# Patient Record
Sex: MALE | Race: OTHER | HISPANIC OR LATINO | Employment: FULL TIME | URBAN - METROPOLITAN AREA
[De-identification: names, ages, dates, MRNs, and addresses within clinical notes are randomized per-mention and may not be internally consistent; named-entity substitution may affect disease eponyms.]

---

## 2019-03-26 ENCOUNTER — HOSPITAL ENCOUNTER (EMERGENCY)
Facility: HOSPITAL | Age: 34
Discharge: HOME | End: 2019-03-26
Attending: EMERGENCY MEDICINE | Admitting: EMERGENCY MEDICINE
Payer: COMMERCIAL

## 2019-03-26 ENCOUNTER — APPOINTMENT (EMERGENCY)
Dept: CT IMAGING | Facility: HOSPITAL | Age: 34
End: 2019-03-26
Payer: COMMERCIAL

## 2019-03-26 VITALS
WEIGHT: 199.08 LBS | RESPIRATION RATE: 18 BRPM | OXYGEN SATURATION: 100 % | SYSTOLIC BLOOD PRESSURE: 185 MMHG | DIASTOLIC BLOOD PRESSURE: 114 MMHG | HEART RATE: 86 BPM | TEMPERATURE: 99 F

## 2019-03-26 DIAGNOSIS — G51.0 BELL'S PALSY: Primary | ICD-10-CM

## 2019-03-26 PROCEDURE — 99284 EMERGENCY DEPT VISIT MOD MDM: CPT

## 2019-03-26 PROCEDURE — 70450 CT HEAD/BRAIN W/O DYE: CPT

## 2019-03-26 RX ORDER — ACYCLOVIR 400 MG/1
400 TABLET ORAL 4 TIMES DAILY
Qty: 28 TABLET | Refills: 0 | Status: SHIPPED | OUTPATIENT
Start: 2019-03-26 | End: 2019-04-02

## 2019-03-26 RX ORDER — PREDNISONE 20 MG/1
60 TABLET ORAL DAILY
Qty: 15 TABLET | Refills: 0 | Status: SHIPPED | OUTPATIENT
Start: 2019-03-26 | End: 2019-03-31

## 2019-03-26 RX ORDER — CARBOXYMETHYLCELLULOSE SODIUM 5 MG/ML
1 SOLUTION/ DROPS OPHTHALMIC 3 TIMES DAILY PRN
Qty: 1 BOTTLE | Refills: 0 | Status: SHIPPED | OUTPATIENT
Start: 2019-03-26 | End: 2019-05-02 | Stop reason: ALTCHOICE

## 2019-03-26 NOTE — ED PROVIDER NOTES
History  Chief Complaint   Patient presents with    Facial Numbness     L sided  Symptoms began yesterday  Patient reports L eye was drooping  Patient significant other reports patient had mouth drooping as well and could not drink fluids normally  Syptoms of mouth drooping have subsided  L eye still appears to be drooping  Janine Beckford has not had difficulty speaking  Patient remained A/O durin this time yesterday  Patient is a 24-year-old male with a history of diabetes who presents with a 1 day history of left-sided facial paralysis  The patient's mother states that symptoms began around noon yesterday  Complete paralysis on the left side of the face  Denies any history of similar symptoms  Denies any headache her numbness  No difficulty swallowing  Patient without any fevers sweats chills  No chest pain shortness of breath or other complaints  States sugar today was around 120  Denies any other sick contacts  Nothing makes this better or worse  None       Past Medical History:   Diagnosis Date    Diabetes mellitus ([facility] [address] )     Psychiatric disorder     Psychiatric disorder        History reviewed  No pertinent surgical history  History reviewed  No pertinent family history  I have reviewed and agree with the history as documented  Social History     Tobacco Use    Smoking status: Never Smoker    Smokeless tobacco: Never Used   Substance Use Topics    Alcohol use: Yes    Drug use: Never        Review of Systems   Constitutional: Negative  HENT: Negative  Eyes: Negative  Respiratory: Negative  Cardiovascular: Negative  Gastrointestinal: Negative  Endocrine: Negative  Genitourinary: Negative  Musculoskeletal: Negative  Skin: Negative  Allergic/Immunologic: Negative  Neurological: Positive for facial asymmetry  Hematological: Negative  Psychiatric/Behavioral: Negative  All other systems reviewed and are negative        Physical Exam  Physical Exam   Constitutional: He is oriented to person, place, and time  He appears well-developed and well-nourished  HENT:   Head: Normocephalic  Right Ear: External ear normal    Left Ear: External ear normal    Nose: Nose normal    Mouth/Throat: Oropharynx is clear and moist    Eyes: Pupils are equal, round, and reactive to light  Conjunctivae and EOM are normal    Neck: Normal range of motion  Neck supple  Cardiovascular: Normal rate, regular rhythm, normal heart sounds and intact distal pulses  Pulmonary/Chest: Effort normal and breath sounds normal    Abdominal: Soft  Bowel sounds are normal    Musculoskeletal: Normal range of motion  Neurological: He is alert and oriented to person, place, and time  He displays normal reflexes  A cranial nerve deficit is present  No sensory deficit  He exhibits normal muscle tone  Coordination normal    Complete paralysis on the left face including forehead  Skin: Skin is warm and dry  Capillary refill takes less than 2 seconds  Psychiatric: He has a normal mood and affect  His behavior is normal  Thought content normal    Nursing note and vitals reviewed  Vital Signs  ED Triage Vitals [03/26/19 1459]   Temperature Pulse Respirations Blood Pressure SpO2   99 °F (37 2 °C) 86 18 (!) 185/114 100 %      Temp Source Heart Rate Source Patient Position - Orthostatic VS BP Location FiO2 (%)   Tympanic Monitor Sitting Left arm --      Pain Score       No Pain           Vitals:    03/26/19 1459   BP: (!) 185/114   Pulse: 86   Patient Position - Orthostatic VS: Sitting         Visual Acuity      ED Medications  Medications - No data to display    Diagnostic Studies  Results Reviewed     None                 CT head without contrast    (Results Pending)              Procedures  Procedures       Phone Contacts  ED Phone Contact    ED Course  ED Course as of Mar 29 1503   Tue Mar 26, 2019   1625 One over results with patient and significant other    Will discharge with steroids and antivirals lack alert follow up with [facility] return to the ER for any concerns  MDM  Number of Diagnoses or Management Options  Bell's palsy:      Amount and/or Complexity of Data Reviewed  Tests in the radiology section of CPT®: ordered and reviewed        Disposition  Final diagnoses:   None     ED Disposition     None      Follow-up Information    None         Patient's Medications    No medications on file     No discharge procedures on file      ED Provider  Electronically Signed by           Marianna Caruso MD  03/26/19 1513       Marianna Caruso MD  03/29/19 035

## 2019-04-03 ENCOUNTER — OFFICE VISIT (OUTPATIENT)
Dept: FAMILY MEDICINE CLINIC | Facility: CLINIC | Age: 34
End: 2019-04-03
Payer: COMMERCIAL

## 2019-04-03 VITALS
TEMPERATURE: 98.2 F | HEIGHT: 69 IN | WEIGHT: 195 LBS | OXYGEN SATURATION: 98 % | HEART RATE: 90 BPM | BODY MASS INDEX: 28.88 KG/M2 | RESPIRATION RATE: 20 BRPM | DIASTOLIC BLOOD PRESSURE: 100 MMHG | SYSTOLIC BLOOD PRESSURE: 160 MMHG

## 2019-04-03 DIAGNOSIS — E66.3 OVERWEIGHT (BMI 25.0-29.9): ICD-10-CM

## 2019-04-03 DIAGNOSIS — Z23 NEEDS FLU SHOT: ICD-10-CM

## 2019-04-03 DIAGNOSIS — Z13.220 SCREENING FOR CHOLESTEROL LEVEL: ICD-10-CM

## 2019-04-03 DIAGNOSIS — I10 ESSENTIAL HYPERTENSION: Primary | ICD-10-CM

## 2019-04-03 DIAGNOSIS — G51.0 BELL'S PALSY: ICD-10-CM

## 2019-04-03 DIAGNOSIS — R73.9 HYPERGLYCEMIA: ICD-10-CM

## 2019-04-03 DIAGNOSIS — R53.83 FATIGUE, UNSPECIFIED TYPE: ICD-10-CM

## 2019-04-03 DIAGNOSIS — E11.65 TYPE 2 DIABETES MELLITUS WITH HYPERGLYCEMIA, WITHOUT LONG-TERM CURRENT USE OF INSULIN (HCC): ICD-10-CM

## 2019-04-03 PROCEDURE — 99204 OFFICE O/P NEW MOD 45 MIN: CPT | Performed by: NURSE PRACTITIONER

## 2019-04-03 RX ORDER — LISINOPRIL AND HYDROCHLOROTHIAZIDE 25; 20 MG/1; MG/1
1 TABLET ORAL DAILY
Qty: 30 TABLET | Refills: 5 | Status: SHIPPED | OUTPATIENT
Start: 2019-04-03

## 2019-04-04 PROBLEM — E11.65 TYPE 2 DIABETES MELLITUS WITH HYPERGLYCEMIA, WITHOUT LONG-TERM CURRENT USE OF INSULIN (HCC): Status: ACTIVE | Noted: 2019-04-04

## 2019-04-04 PROBLEM — E66.3 OVERWEIGHT (BMI 25.0-29.9): Status: ACTIVE | Noted: 2019-04-04

## 2019-04-04 PROBLEM — G51.0 BELL'S PALSY: Status: ACTIVE | Noted: 2019-04-04

## 2019-04-17 ENCOUNTER — TRANSCRIBE ORDERS (OUTPATIENT)
Dept: ADMINISTRATIVE | Facility: HOSPITAL | Age: 34
End: 2019-04-17

## 2019-04-17 ENCOUNTER — APPOINTMENT (OUTPATIENT)
Dept: LAB | Facility: HOSPITAL | Age: 34
End: 2019-04-17
Payer: COMMERCIAL

## 2019-04-17 DIAGNOSIS — R53.83 FATIGUE, UNSPECIFIED TYPE: ICD-10-CM

## 2019-04-17 DIAGNOSIS — E66.3 OVERWEIGHT (BMI 25.0-29.9): ICD-10-CM

## 2019-04-17 DIAGNOSIS — E11.65 TYPE 2 DIABETES MELLITUS WITH HYPERGLYCEMIA, WITHOUT LONG-TERM CURRENT USE OF INSULIN (HCC): ICD-10-CM

## 2019-04-17 DIAGNOSIS — Z13.220 SCREENING FOR CHOLESTEROL LEVEL: ICD-10-CM

## 2019-04-17 DIAGNOSIS — R73.9 HYPERGLYCEMIA: ICD-10-CM

## 2019-04-17 LAB
ALBUMIN SERPL BCP-MCNC: 4.3 G/DL (ref 3–5.2)
ALP SERPL-CCNC: 100 U/L (ref 43–122)
ALT SERPL W P-5'-P-CCNC: 18 U/L (ref 9–52)
ANION GAP SERPL CALCULATED.3IONS-SCNC: 8 MMOL/L (ref 5–14)
AST SERPL W P-5'-P-CCNC: 19 U/L (ref 17–59)
BASOPHILS # BLD AUTO: 0.1 THOUSANDS/ΜL (ref 0–0.1)
BASOPHILS NFR BLD AUTO: 1 % (ref 0–1)
BILIRUB SERPL-MCNC: 0.5 MG/DL
BUN SERPL-MCNC: 9 MG/DL (ref 5–25)
CALCIUM SERPL-MCNC: 9.6 MG/DL (ref 8.4–10.2)
CHLORIDE SERPL-SCNC: 98 MMOL/L (ref 97–108)
CHOLEST SERPL-MCNC: 199 MG/DL
CO2 SERPL-SCNC: 30 MMOL/L (ref 22–30)
CREAT SERPL-MCNC: 0.61 MG/DL (ref 0.7–1.5)
EOSINOPHIL # BLD AUTO: 0.5 THOUSAND/ΜL (ref 0–0.4)
EOSINOPHIL NFR BLD AUTO: 6 % (ref 0–6)
ERYTHROCYTE [DISTWIDTH] IN BLOOD BY AUTOMATED COUNT: 13.5 %
EST. AVERAGE GLUCOSE BLD GHB EST-MCNC: 209 MG/DL
GFR SERPL CREATININE-BSD FRML MDRD: 131 ML/MIN/1.73SQ M
GLUCOSE P FAST SERPL-MCNC: 244 MG/DL (ref 70–99)
HBA1C MFR BLD: 8.9 % (ref 4.2–6.3)
HCT VFR BLD AUTO: 46 % (ref 41–53)
HDLC SERPL-MCNC: 35 MG/DL (ref 40–59)
HGB BLD-MCNC: 15.7 G/DL (ref 13.5–17.5)
LDLC SERPL CALC-MCNC: 111 MG/DL
LYMPHOCYTES # BLD AUTO: 1.8 THOUSANDS/ΜL (ref 0.5–4)
LYMPHOCYTES NFR BLD AUTO: 24 % (ref 25–45)
MCH RBC QN AUTO: 30.2 PG (ref 26–34)
MCHC RBC AUTO-ENTMCNC: 34.2 G/DL (ref 31–36)
MCV RBC AUTO: 88 FL (ref 80–100)
MONOCYTES # BLD AUTO: 0.5 THOUSAND/ΜL (ref 0.2–0.9)
MONOCYTES NFR BLD AUTO: 7 % (ref 1–10)
NEUTROPHILS # BLD AUTO: 4.7 THOUSANDS/ΜL (ref 1.8–7.8)
NEUTS SEG NFR BLD AUTO: 62 % (ref 45–65)
NONHDLC SERPL-MCNC: 164 MG/DL
PLATELET # BLD AUTO: 221 THOUSANDS/UL (ref 150–450)
PMV BLD AUTO: 8.9 FL (ref 8.9–12.7)
POTASSIUM SERPL-SCNC: 4.9 MMOL/L (ref 3.6–5)
PROT SERPL-MCNC: 7.3 G/DL (ref 5.9–8.4)
RBC # BLD AUTO: 5.2 MILLION/UL (ref 4.5–5.9)
SODIUM SERPL-SCNC: 136 MMOL/L (ref 137–147)
TRIGL SERPL-MCNC: 267 MG/DL
TSH SERPL DL<=0.05 MIU/L-ACNC: 3.59 UIU/ML (ref 0.47–4.68)
WBC # BLD AUTO: 7.5 THOUSAND/UL (ref 4.5–11)

## 2019-04-17 PROCEDURE — 85025 COMPLETE CBC W/AUTO DIFF WBC: CPT

## 2019-04-17 PROCEDURE — 84443 ASSAY THYROID STIM HORMONE: CPT

## 2019-04-17 PROCEDURE — 36415 COLL VENOUS BLD VENIPUNCTURE: CPT

## 2019-04-17 PROCEDURE — 80061 LIPID PANEL: CPT

## 2019-04-17 PROCEDURE — 83036 HEMOGLOBIN GLYCOSYLATED A1C: CPT

## 2019-04-17 PROCEDURE — 80053 COMPREHEN METABOLIC PANEL: CPT

## 2019-05-02 ENCOUNTER — OFFICE VISIT (OUTPATIENT)
Dept: FAMILY MEDICINE CLINIC | Facility: CLINIC | Age: 34
End: 2019-05-02
Payer: COMMERCIAL

## 2019-05-02 VITALS
BODY MASS INDEX: 29.03 KG/M2 | SYSTOLIC BLOOD PRESSURE: 132 MMHG | OXYGEN SATURATION: 98 % | HEART RATE: 91 BPM | DIASTOLIC BLOOD PRESSURE: 82 MMHG | WEIGHT: 196 LBS | RESPIRATION RATE: 20 BRPM | HEIGHT: 69 IN | TEMPERATURE: 98.2 F

## 2019-05-02 DIAGNOSIS — E78.2 MIXED HYPERLIPIDEMIA: ICD-10-CM

## 2019-05-02 DIAGNOSIS — E11.65 TYPE 2 DIABETES MELLITUS WITH HYPERGLYCEMIA, WITHOUT LONG-TERM CURRENT USE OF INSULIN (HCC): Primary | ICD-10-CM

## 2019-05-02 DIAGNOSIS — I10 ESSENTIAL HYPERTENSION: ICD-10-CM

## 2019-05-02 LAB
CREAT UR-MCNC: 97.9 MG/DL
MICROALBUMIN UR-MCNC: <5 MG/L (ref 0–20)
MICROALBUMIN/CREAT 24H UR: <5 MG/G CREATININE (ref 0–30)

## 2019-05-02 PROCEDURE — 82043 UR ALBUMIN QUANTITATIVE: CPT | Performed by: NURSE PRACTITIONER

## 2019-05-02 PROCEDURE — 82570 ASSAY OF URINE CREATININE: CPT | Performed by: NURSE PRACTITIONER

## 2019-05-02 PROCEDURE — 99214 OFFICE O/P EST MOD 30 MIN: CPT | Performed by: NURSE PRACTITIONER

## 2019-06-11 LAB
LEFT EYE DIABETIC RETINOPATHY: NORMAL
RIGHT EYE DIABETIC RETINOPATHY: NORMAL

## 2019-07-22 ENCOUNTER — OFFICE VISIT (OUTPATIENT)
Dept: FAMILY MEDICINE CLINIC | Facility: CLINIC | Age: 34
End: 2019-07-22
Payer: COMMERCIAL

## 2019-07-22 VITALS
BODY MASS INDEX: 29.92 KG/M2 | SYSTOLIC BLOOD PRESSURE: 122 MMHG | HEART RATE: 90 BPM | TEMPERATURE: 97.2 F | OXYGEN SATURATION: 98 % | DIASTOLIC BLOOD PRESSURE: 80 MMHG | RESPIRATION RATE: 18 BRPM | WEIGHT: 202 LBS | HEIGHT: 69 IN

## 2019-07-22 DIAGNOSIS — E11.65 TYPE 2 DIABETES MELLITUS WITH HYPERGLYCEMIA, WITHOUT LONG-TERM CURRENT USE OF INSULIN (HCC): Primary | ICD-10-CM

## 2019-07-22 DIAGNOSIS — I10 ESSENTIAL HYPERTENSION: ICD-10-CM

## 2019-07-22 DIAGNOSIS — E78.2 MIXED HYPERLIPIDEMIA: ICD-10-CM

## 2019-07-22 LAB — SL AMB POCT HEMOGLOBIN AIC: 8.3 (ref ?–6.5)

## 2019-07-22 PROCEDURE — 83036 HEMOGLOBIN GLYCOSYLATED A1C: CPT | Performed by: NURSE PRACTITIONER

## 2019-07-22 PROCEDURE — 99214 OFFICE O/P EST MOD 30 MIN: CPT | Performed by: NURSE PRACTITIONER

## 2019-07-22 RX ORDER — ATORVASTATIN CALCIUM 40 MG/1
40 TABLET, FILM COATED ORAL DAILY
Qty: 30 TABLET | Refills: 5 | Status: SHIPPED | OUTPATIENT
Start: 2019-07-22

## 2019-07-22 NOTE — ASSESSMENT & PLAN NOTE
Lab Results   Component Value Date    HGBA1C 8 3 (A) 07/22/2019       No results for input(s): POCGLU in the last 72 hours  Blood Sugar Average: Last 72 hrs:  -Today A1c 8 3% with some improvement from previous 8 9% in 4/2019  Pt currently only on Jardiance 25mg daily from [facility]  I recommended to patient to start on Metformin 500mg BID  To continue to follow his diet and exercise regimen  For follow up in 3 months for DM check

## 2019-07-22 NOTE — ASSESSMENT & PLAN NOTE
-Today we are starting patient on Atorvastatin 40mg daily as he is diabetic and did have elevated cholesterol  Pt to continue to follow his diet regimen with exercise and weight loss  To follow up every 6 mo

## 2019-07-22 NOTE — PROGRESS NOTES
Assessment/Plan:    Type 2 diabetes mellitus with hyperglycemia, without long-term current use of insulin (HCC)  Lab Results   Component Value Date    HGBA1C 8 3 (A) 07/22/2019       No results for input(s): POCGLU in the last 72 hours  Blood Sugar Average: Last 72 hrs:  -Today A1c 8 3% with some improvement from previous 8 9% in 4/2019  Pt currently only on Jardiance 25mg daily from [facility]  I recommended to patient to start on Metformin 500mg BID  To continue to follow his diet and exercise regimen  For follow up in 3 months for DM check  Essential hypertension  -With good control today  To continue on lisinopril/hctz 20-25mg daily as indicated  To have repeat CMP in 10/2019  Follow up every 6 months  Mixed hyperlipidemia  -Today we are starting patient on Atorvastatin 40mg daily as he is diabetic and did have elevated cholesterol  Pt to continue to follow his diet regimen with exercise and weight loss  To follow up every 6 mo  Diagnoses and all orders for this visit:    Type 2 diabetes mellitus with hyperglycemia, without long-term current use of insulin (HCC)  -     POCT hemoglobin A1c  -     metFORMIN (GLUCOPHAGE) 500 mg tablet; Take 1 tablet (500 mg total) by mouth 2 (two) times a day with meals    Mixed hyperlipidemia  -     atorvastatin (LIPITOR) 40 mg tablet; Take 1 tablet (40 mg total) by mouth daily    Essential hypertension    Other orders  -     Cancel: Comprehensive metabolic panel; Future  -     Cancel: HEMOGLOBIN A1C W/ EAG ESTIMATION; Future          Subjective:      Patient ID: Bernardo Lau is a 29 y o  male  Cc  Follow up dm    Diabetes   He presents for his follow-up diabetic visit  He has type 2 diabetes mellitus  No MedicAlert identification noted  His disease course has been fluctuating  There are no hypoglycemic associated symptoms  Pertinent negatives for hypoglycemia include no headaches or sweats   Pertinent negatives for diabetes include no chest pain, no fatigue, no foot paresthesias, no foot ulcerations, no polydipsia, no polyphagia, no polyuria, no visual change and no weight loss  There are no hypoglycemic complications  Symptoms are stable  There are no diabetic complications  Pertinent negatives for diabetic complications include no autonomic neuropathy, CVA, peripheral neuropathy or retinopathy  Risk factors for coronary artery disease include diabetes mellitus, dyslipidemia, male sex, obesity and sedentary lifestyle  Current diabetic treatment includes oral agent (monotherapy)  He is compliant with treatment all of the time  His weight is stable  He is following a generally healthy diet  He has not had a previous visit with a dietitian  He participates in exercise intermittently  There is no change in his home blood glucose trend  An ACE inhibitor/angiotensin II receptor blocker is being taken  He does not see a podiatrist Eye exam is current  Hypertension   This is a recurrent problem  The current episode started today  The problem has been gradually improving since onset  The problem is controlled  Pertinent negatives include no chest pain, headaches, orthopnea, palpitations, peripheral edema, shortness of breath or sweats  There are no associated agents to hypertension  Risk factors for coronary artery disease include diabetes mellitus, dyslipidemia, obesity, male gender and sedentary lifestyle  Past treatments include diuretics and ACE inhibitors  The current treatment provides significant improvement  Compliance problems include diet and exercise  There is no history of angina, kidney disease, CAD/MI, CVA or retinopathy  There is no history of chronic renal disease, a hypertension causing med, sleep apnea or a thyroid problem         The following portions of the patient's history were reviewed and updated as appropriate: allergies, current medications, past family history, past medical history, past social history, past surgical history and problem list     Review of Systems   Constitutional: Positive for unexpected weight change  Negative for appetite change, fatigue and weight loss  HENT: Negative  Negative for congestion  Eyes: Negative  Respiratory: Negative  Negative for chest tightness, shortness of breath and wheezing  Cardiovascular: Negative  Negative for chest pain, palpitations, orthopnea and leg swelling  Gastrointestinal: Negative  Negative for abdominal distention and abdominal pain  Endocrine: Negative  Negative for cold intolerance, polydipsia, polyphagia and polyuria  Genitourinary: Negative  Musculoskeletal: Negative  Negative for arthralgias  Skin: Negative  Allergic/Immunologic: Negative  Negative for environmental allergies and immunocompromised state  Neurological: Negative  Negative for headaches  Hematological: Negative  Negative for adenopathy  Does not bruise/bleed easily  Psychiatric/Behavioral: Negative  Objective:      /80 (BP Location: Left arm, Patient Position: Sitting, Cuff Size: Standard)   Pulse 90   Temp (!) 97 2 °F (36 2 °C) (Temporal)   Resp 18   Ht 5' 9" (1 753 m)   Wt 91 6 kg (202 lb)   SpO2 98%   BMI 29 83 kg/m²          Physical Exam   Constitutional: He is oriented to person, place, and time  He appears well-developed and well-nourished  No distress  HENT:   Head: Normocephalic and atraumatic  Eyes: Pupils are equal, round, and reactive to light  Conjunctivae and EOM are normal    Neck: Normal range of motion  Neck supple  Cardiovascular: Normal rate, regular rhythm, normal heart sounds and intact distal pulses  Exam reveals no gallop and no friction rub  No murmur heard  Pulmonary/Chest: Effort normal and breath sounds normal  No respiratory distress  He has no wheezes  Abdominal: Soft  Bowel sounds are normal  He exhibits no distension  There is no tenderness  Musculoskeletal: Normal range of motion     Lymphadenopathy:     He has no cervical adenopathy  Neurological: He is alert and oriented to person, place, and time  Skin: Skin is warm and dry  Capillary refill takes less than 2 seconds  He is not diaphoretic  Psychiatric: He has a normal mood and affect  His behavior is normal  Judgment and thought content normal    Nursing note and vitals reviewed

## 2019-07-22 NOTE — ASSESSMENT & PLAN NOTE
-With good control today  To continue on lisinopril/hctz 20-25mg daily as indicated  To have repeat CMP in 10/2019  Follow up every 6 months

## 2020-08-13 ENCOUNTER — TELEPHONE (OUTPATIENT)
Dept: FAMILY MEDICINE CLINIC | Facility: CLINIC | Age: 35
End: 2020-08-13

## 2020-08-13 DIAGNOSIS — E11.65 TYPE 2 DIABETES MELLITUS WITH HYPERGLYCEMIA, WITHOUT LONG-TERM CURRENT USE OF INSULIN (HCC): Primary | ICD-10-CM

## 2020-11-03 ENCOUNTER — TELEPHONE (OUTPATIENT)
Dept: FAMILY MEDICINE CLINIC | Facility: CLINIC | Age: 35
End: 2020-11-03

## 2022-05-17 ENCOUNTER — HOSPITAL ENCOUNTER (EMERGENCY)
Facility: HOSPITAL | Age: 37
Discharge: HOME | End: 2022-05-18
Attending: EMERGENCY MEDICINE

## 2022-05-17 DIAGNOSIS — R73.9 HYPERGLYCEMIA: ICD-10-CM

## 2022-05-17 DIAGNOSIS — E86.0 DEHYDRATION: Primary | ICD-10-CM

## 2022-05-17 LAB — GLUCOSE SERPL-MCNC: 351 MG/DL (ref 65–140)

## 2022-05-17 PROCEDURE — 93005 ELECTROCARDIOGRAM TRACING: CPT

## 2022-05-17 PROCEDURE — 82948 REAGENT STRIP/BLOOD GLUCOSE: CPT

## 2022-05-17 PROCEDURE — 99283 EMERGENCY DEPT VISIT LOW MDM: CPT

## 2022-05-18 VITALS
OXYGEN SATURATION: 99 % | BODY MASS INDEX: 29.72 KG/M2 | HEART RATE: 81 BPM | DIASTOLIC BLOOD PRESSURE: 109 MMHG | TEMPERATURE: 97.6 F | SYSTOLIC BLOOD PRESSURE: 180 MMHG | RESPIRATION RATE: 16 BRPM | WEIGHT: 201.28 LBS

## 2022-05-18 LAB
ALBUMIN SERPL BCP-MCNC: 4.9 G/DL (ref 3–5.2)
ALP SERPL-CCNC: 114 U/L (ref 43–122)
ALT SERPL W P-5'-P-CCNC: 40 U/L
ANION GAP SERPL CALCULATED.3IONS-SCNC: 9 MMOL/L (ref 5–14)
AST SERPL W P-5'-P-CCNC: 31 U/L (ref 17–59)
ATRIAL RATE: 81 BPM
BACTERIA UR QL AUTO: NORMAL /HPF
BASE EX.OXY STD BLDV CALC-SCNC: 51.1 % (ref 60–80)
BASE EXCESS BLDV CALC-SCNC: 2 MMOL/L
BASOPHILS # BLD AUTO: 0.06 THOUSANDS/ΜL (ref 0–0.1)
BASOPHILS NFR BLD AUTO: 0 % (ref 0–1)
BETA-HYDROXYBUTYRATE: 0.9 MMOL/L
BILIRUB SERPL-MCNC: 0.75 MG/DL
BILIRUB UR QL STRIP: NEGATIVE
BUN SERPL-MCNC: 10 MG/DL (ref 5–25)
CALCIUM SERPL-MCNC: 9.5 MG/DL (ref 8.4–10.2)
CARDIAC TROPONIN I PNL SERPL HS: <2 NG/L
CHLORIDE SERPL-SCNC: 91 MMOL/L (ref 97–108)
CLARITY UR: CLEAR
CO2 SERPL-SCNC: 30 MMOL/L (ref 22–30)
COLOR UR: ABNORMAL
CREAT SERPL-MCNC: 0.71 MG/DL (ref 0.7–1.5)
EOSINOPHIL # BLD AUTO: 0.11 THOUSAND/ΜL (ref 0–0.61)
EOSINOPHIL NFR BLD AUTO: 1 % (ref 0–6)
ERYTHROCYTE [DISTWIDTH] IN BLOOD BY AUTOMATED COUNT: 11.9 % (ref 11.6–15.1)
GFR SERPL CREATININE-BSD FRML MDRD: 120 ML/MIN/1.73SQ M
GLUCOSE SERPL-MCNC: 324 MG/DL (ref 65–140)
GLUCOSE SERPL-MCNC: 340 MG/DL (ref 70–99)
GLUCOSE UR STRIP-MCNC: ABNORMAL MG/DL
HCO3 BLDV-SCNC: 28.7 MMOL/L (ref 24–30)
HCT VFR BLD AUTO: 46.2 % (ref 36.5–49.3)
HGB BLD-MCNC: 15.7 G/DL (ref 12–17)
HGB UR QL STRIP.AUTO: NEGATIVE
IMM GRANULOCYTES # BLD AUTO: 0.05 THOUSAND/UL (ref 0–0.2)
IMM GRANULOCYTES NFR BLD AUTO: 0 % (ref 0–2)
KETONES UR STRIP-MCNC: ABNORMAL MG/DL
LEUKOCYTE ESTERASE UR QL STRIP: NEGATIVE
LYMPHOCYTES # BLD AUTO: 0.95 THOUSANDS/ΜL (ref 0.6–4.47)
LYMPHOCYTES NFR BLD AUTO: 7 % (ref 14–44)
MCH RBC QN AUTO: 29.2 PG (ref 26.8–34.3)
MCHC RBC AUTO-ENTMCNC: 34 G/DL (ref 31.4–37.4)
MCV RBC AUTO: 86 FL (ref 82–98)
MONOCYTES # BLD AUTO: 0.54 THOUSAND/ΜL (ref 0.17–1.22)
MONOCYTES NFR BLD AUTO: 4 % (ref 4–12)
NEUTROPHILS # BLD AUTO: 11.89 THOUSANDS/ΜL (ref 1.85–7.62)
NEUTS SEG NFR BLD AUTO: 88 % (ref 43–75)
NITRITE UR QL STRIP: NEGATIVE
NON-SQ EPI CELLS URNS QL MICRO: NORMAL /HPF
NRBC BLD AUTO-RTO: 0 /100 WBCS
O2 CT BLDV-SCNC: 11.6 ML/DL
P AXIS: 30 DEGREES
PCO2 BLDV: 52 MM HG (ref 42–50)
PH BLDV: 7.36 [PH] (ref 7.3–7.4)
PH UR STRIP.AUTO: 8 [PH]
PLATELET # BLD AUTO: 219 THOUSANDS/UL (ref 149–390)
PMV BLD AUTO: 10.5 FL (ref 8.9–12.7)
PO2 BLDV: 26.3 MM HG (ref 35–45)
POTASSIUM SERPL-SCNC: 4.2 MMOL/L (ref 3.6–5)
PR INTERVAL: 166 MS
PROT SERPL-MCNC: 8.6 G/DL (ref 5.9–8.4)
PROT UR STRIP-MCNC: NEGATIVE MG/DL
QRS AXIS: 13 DEGREES
QRSD INTERVAL: 100 MS
QT INTERVAL: 378 MS
QTC INTERVAL: 439 MS
RBC # BLD AUTO: 5.38 MILLION/UL (ref 3.88–5.62)
RBC #/AREA URNS AUTO: NORMAL /HPF
SODIUM SERPL-SCNC: 130 MMOL/L (ref 137–147)
SP GR UR STRIP.AUTO: 1.01 (ref 1–1.04)
T WAVE AXIS: 4 DEGREES
UROBILINOGEN UA: NEGATIVE MG/DL
VENTRICULAR RATE: 81 BPM
WBC # BLD AUTO: 13.6 THOUSAND/UL (ref 4.31–10.16)
WBC #/AREA URNS AUTO: NORMAL /HPF

## 2022-05-18 PROCEDURE — 36415 COLL VENOUS BLD VENIPUNCTURE: CPT | Performed by: PHYSICIAN ASSISTANT

## 2022-05-18 PROCEDURE — 96361 HYDRATE IV INFUSION ADD-ON: CPT

## 2022-05-18 PROCEDURE — 81001 URINALYSIS AUTO W/SCOPE: CPT | Performed by: PHYSICIAN ASSISTANT

## 2022-05-18 PROCEDURE — 82010 KETONE BODYS QUAN: CPT | Performed by: PHYSICIAN ASSISTANT

## 2022-05-18 PROCEDURE — 80053 COMPREHEN METABOLIC PANEL: CPT | Performed by: PHYSICIAN ASSISTANT

## 2022-05-18 PROCEDURE — 82805 BLOOD GASES W/O2 SATURATION: CPT | Performed by: PHYSICIAN ASSISTANT

## 2022-05-18 PROCEDURE — 93010 ELECTROCARDIOGRAM REPORT: CPT

## 2022-05-18 PROCEDURE — 99285 EMERGENCY DEPT VISIT HI MDM: CPT | Performed by: PHYSICIAN ASSISTANT

## 2022-05-18 PROCEDURE — 85025 COMPLETE CBC W/AUTO DIFF WBC: CPT | Performed by: PHYSICIAN ASSISTANT

## 2022-05-18 PROCEDURE — 84484 ASSAY OF TROPONIN QUANT: CPT | Performed by: PHYSICIAN ASSISTANT

## 2022-05-18 PROCEDURE — 96360 HYDRATION IV INFUSION INIT: CPT

## 2022-05-18 PROCEDURE — 82948 REAGENT STRIP/BLOOD GLUCOSE: CPT

## 2022-05-18 RX ADMIN — SODIUM CHLORIDE 1000 ML: 0.9 INJECTION, SOLUTION INTRAVENOUS at 00:08

## 2022-05-18 NOTE — ED PROVIDER NOTES
History  Chief Complaint   Patient presents with    Medical Problem     Pt was working, started shaking and vomited  Feels better at this time  Pt's girlfriend gave him soda at time of event  Pt is diabetic  59-year-old male with history of diabetes presents with wife for evaluation after a near syncopal episode  Patient tells me that he was at work and was moving around rapidly when he became dizzy and felt like he was going to faint  Patient then drank a soda and felt better but was concerned that his glucose levels may be off and wanted to get evaluated  Denies any chest pain or shortness of breath during this episode  On arrival to the emergency department patient states that all of his symptoms have resolved  Denies any complaints at this time       History provided by:  Patient   used: No        Prior to Admission Medications   Prescriptions Last Dose Informant Patient Reported? Taking? Empagliflozin (JARDIANCE) 25 MG TABS   No No   Sig: Take 1 tablet (25 mg total) by mouth every morning   acyclovir (ZOVIRAX) 400 MG tablet   No No   Sig: Take 1 tablet (400 mg total) by mouth 4 (four) times a day for 7 days   atorvastatin (LIPITOR) 40 mg tablet   No No   Sig: Take 1 tablet (40 mg total) by mouth daily   lisinopril-hydrochlorothiazide (PRINZIDE,ZESTORETIC) 20-25 MG per tablet   No No   Sig: Take 1 tablet by mouth daily   metFORMIN (GLUCOPHAGE) 500 mg tablet   No No   Sig: Take 1 tablet (500 mg total) by mouth 2 (two) times a day with meals      Facility-Administered Medications: None       Past Medical History:   Diagnosis Date    Bell's palsy     Diabetes mellitus ([facility] [address]     Psychiatric disorder     Psychiatric disorder        History reviewed  No pertinent surgical history  Family History   Problem Relation Age of Onset    Hypertension Mother     Diabetes Father     Hypertension Father      I have reviewed and agree with the history as documented      E-Cigarette/Vaping E-Cigarette/Vaping Substances     Social History     Tobacco Use    Smoking status: Never Smoker    Smokeless tobacco: Never Used   Substance Use Topics    Alcohol use: Yes     Alcohol/week: 8 0 standard drinks     Types: 8 Cans of beer per week    Drug use: Never       Review of Systems   Constitutional: Negative  Negative for chills and fatigue  HENT: Negative for ear pain and sore throat  Eyes: Negative for photophobia and redness  Respiratory: Negative for apnea, cough and shortness of breath  Cardiovascular: Negative for chest pain  Gastrointestinal: Negative for abdominal pain, nausea and vomiting  Genitourinary: Negative for dysuria  Musculoskeletal: Negative for arthralgias, neck pain and neck stiffness  Skin: Negative for rash  Neurological: Positive for light-headedness (Resolved)  Negative for dizziness, tremors, syncope and weakness  Psychiatric/Behavioral: Negative for suicidal ideas  Physical Exam  Physical Exam  Constitutional:       General: He is not in acute distress  Appearance: He is well-developed  He is not diaphoretic  Eyes:      Pupils: Pupils are equal, round, and reactive to light  Cardiovascular:      Rate and Rhythm: Normal rate and regular rhythm  Pulmonary:      Effort: Pulmonary effort is normal  No respiratory distress  Breath sounds: Normal breath sounds  Abdominal:      General: Bowel sounds are normal  There is no distension  Palpations: Abdomen is soft  Tenderness: There is no abdominal tenderness  There is no guarding  Musculoskeletal:         General: Normal range of motion  Cervical back: Normal range of motion and neck supple  Skin:     General: Skin is warm and dry  Neurological:      General: No focal deficit present  Mental Status: He is alert and oriented to person, place, and time  Cranial Nerves: No cranial nerve deficit  Sensory: No sensory deficit  Motor: No weakness  Gait: Gait normal          Vital Signs  ED Triage Vitals [05/17/22 2307]   Temperature Pulse Respirations Blood Pressure SpO2   97 6 °F (36 4 °C) 93 15 (!) 200/117 97 %      Temp Source Heart Rate Source Patient Position - Orthostatic VS BP Location FiO2 (%)   Oral Monitor Sitting Left arm --      Pain Score       --           Vitals:    05/17/22 2307 05/18/22 0129   BP: (!) 200/117 (!) 180/109   Pulse: 93 81   Patient Position - Orthostatic VS: Sitting Lying         Visual Acuity      ED Medications  Medications   sodium chloride 0 9 % bolus 1,000 mL (1,000 mL Intravenous New Bag 5/18/22 0008)       Diagnostic Studies  Results Reviewed     Procedure Component Value Units Date/Time    Urine Microscopic [[id number]]  (Normal) Collected: 05/18/22 0210    Lab Status: Final result Specimen: Urine, Clean Catch Updated: 05/18/22 0226     RBC, UA None Seen /hpf      WBC, UA 1-2 /hpf      Epithelial Cells None Seen /hpf      Bacteria, UA None Seen /hpf     UA (URINE) with reflex to Scope [[id number]]  (Abnormal) Collected: 05/18/22 0210    Lab Status: Final result Specimen: Urine, Clean Catch Updated: 05/18/22 0222     Color, UA Straw     Clarity, UA Clear     Specific Gravity, UA 1 015     pH, UA 8 0     Leukocytes, UA Negative     Nitrite, UA Negative     Protein, UA Negative mg/dl      Glucose, UA >=1000 (1%) mg/dl      Ketones, UA 50 (2+) mg/dl      Bilirubin, UA Negative     Blood, UA Negative     UROBILINOGEN UA Negative mg/dL     HS Troponin I 4hr [[id number]]     Lab Status: No result Specimen: Blood     Fingerstick Glucose (POCT) [[id number]]  (Abnormal) Collected: 05/18/22 0125    Lab Status: Final result Updated: 05/18/22 0128     POC Glucose 324 mg/dl     HS Troponin 0hr (reflex protocol) [[id number]]  (Normal) Collected: 05/18/22 0010    Lab Status: Final result Specimen: Blood from Arm, Right Updated: 05/18/22 0042     hs TnI 0hr <2 ng/L     HS Troponin I 2hr [[id number]]     Lab Status: No result Specimen: Blood Comprehensive metabolic panel [[id number]]  (Abnormal) Collected: 05/18/22 0010    Lab Status: Final result Specimen: Blood from Arm, Right Updated: 05/18/22 0030     Sodium 130 mmol/L      Potassium 4 2 mmol/L      Chloride 91 mmol/L      CO2 30 mmol/L      ANION GAP 9 mmol/L      BUN 10 mg/dL      Creatinine 0 71 mg/dL      Glucose 340 mg/dL      Calcium 9 5 mg/dL      AST 31 U/L      ALT 40 U/L      Alkaline Phosphatase 114 U/L      Total Protein 8 6 g/dL      Albumin 4 9 g/dL      Total Bilirubin 0 75 mg/dL      eGFR 120 ml/min/1 73sq m     Narrative:      National Kidney Disease Foundation guidelines for Chronic Kidney Disease (CKD):     Stage 1 with normal or high GFR (GFR > 90 mL/min/1 73 square meters)    Stage 2 Mild CKD (GFR = 60-89 mL/min/1 73 square meters)    Stage 3A Moderate CKD (GFR = 45-59 mL/min/1 73 square meters)    Stage 3B Moderate CKD (GFR = 30-44 mL/min/1 73 square meters)    Stage 4 Severe CKD (GFR = 15-29 mL/min/1 73 square meters)    Stage 5 End Stage CKD (GFR <15 mL/min/1 73 square meters)  Note: GFR calculation is accurate only with a steady state creatinine    Beta Hydroxybutyrate [[id number]]  (Abnormal) Collected: 05/18/22 0010    Lab Status: Final result Specimen: Blood from Arm, Right Updated: 05/18/22 0024     BETA-HYDROXYBUTYRATE 0 9 mmol/L     Blood gas, venous [[id number]]  (Abnormal) Collected: 05/18/22 0010    Lab Status: Final result Specimen: Blood from Arm, Left Updated: 05/18/22 0020     pH, Anil 7 359     pCO2, Anil 52 0 mm Hg      pO2, Anil 26 3 mm Hg      HCO3, Anil 28 7 mmol/L      Base Excess, Anil 2 0 mmol/L      O2 Content, Anil 11 6 ml/dL      O2 HGB, VENOUS 51 1 %     CBC and differential [[id number]]  (Abnormal) Collected: 05/18/22 0010    Lab Status: Final result Specimen: Blood from Arm, Right Updated: 05/18/22 0019     WBC 13 60 Thousand/uL      RBC 5 38 Million/uL      Hemoglobin 15 7 g/dL      Hematocrit 46 2 %      MCV 86 fL      MCH 29 2 pg      MCHC 34 0 g/dL      RDW 11 9 %      MPV 10 5 fL      Platelets 860 Thousands/uL      nRBC 0 /100 WBCs      Neutrophils Relative 88 %      Immat GRANS % 0 %      Lymphocytes Relative 7 %      Monocytes Relative 4 %      Eosinophils Relative 1 %      Basophils Relative 0 %      Neutrophils Absolute 11 89 Thousands/µL      Immature Grans Absolute 0 05 Thousand/uL      Lymphocytes Absolute 0 95 Thousands/µL      Monocytes Absolute 0 54 Thousand/µL      Eosinophils Absolute 0 11 Thousand/µL      Basophils Absolute 0 06 Thousands/µL     Fingerstick Glucose (POCT) [[id number]]  (Abnormal) Collected: 05/17/22 2311    Lab Status: Final result Updated: 05/17/22 2312     POC Glucose 351 mg/dl                  No orders to display              Procedures  ECG 12 Lead Documentation Only    Date/Time: 5/18/2022 2:07 AM  Performed by: Camryn Soriano PA-C  Authorized by: Camryn Soriano PA-C     Indications / Diagnosis:  Weakness   ECG reviewed by me, the ED Provider: yes    Patient location:  ED  Interpretation:     Interpretation: normal    Rate:     ECG rate:  81    ECG rate assessment: normal    Rhythm:     Rhythm: sinus rhythm    Ectopy:     Ectopy: none    QRS:     QRS axis:  Normal    QRS intervals:  Normal  Conduction:     Conduction: normal    ST segments:     ST segments:  Normal  T waves:     T waves: inverted      Inverted:  III             ED Course                                             MDM  Number of Diagnoses or Management Options  Dehydration: new and does not require workup  Hyperglycemia: new and does not require workup  Diagnosis management comments: Patient presented for evaluation after near syncopal episode associated with weakness and dizziness at work  Upon arrival patient had complete resolution of symptoms  Patient benign evaluation emergency department without laboratory evaluation consistent for diabetic ketoacidosis  No signs of acute infection today    Upon re-evaluation patient had complete resolution of symptoms and denied any complaints at this time  Patient was advised that his glucose level is elevated was advised to make an appointment with his primary care doctor to discuss medication management  Agrees to follow-up  Ambulated out apartment  Amount and/or Complexity of Data Reviewed  Clinical lab tests: ordered    Risk of Complications, Morbidity, and/or Mortality  Presenting problems: moderate  Diagnostic procedures: moderate  Management options: moderate    Patient Progress  Patient progress: stable      Disposition  Final diagnoses:   Dehydration   Hyperglycemia     Time reflects when diagnosis was documented in both MDM as applicable and the Disposition within this note     Time User Action Codes Description Comment    5/18/2022  2:35 AM Martine Rummer Add [E86 0] Dehydration     5/18/2022  2:35 AM Martine Rummer Add [E16 2] Hypoglycemia     5/18/2022  2:35 AM Luis Gonzalez Shree [E16 2] Hypoglycemia     5/18/2022  2:35 AM Martine Rummer Add [R73 9] Hyperglycemia       ED Disposition     ED Disposition   Discharge    Condition   Stable    Date/Time   Wed May 18, 2022  2:35 AM    Comment   Brooklyn Lugo discharge to home/self care  Follow-up Information     Follow up With Specialties Details Why Contact Info Additional Information    SONIA Carrillo Nurse Practitioner Call  As needed [address] [place] [address]  [address] [facility] Emergency Department Emergency Medicine Go to  If symptoms worsen [address] [id number]  [facility] Emergency Department          Patient's Medications   Discharge Prescriptions    No medications on file       No discharge procedures on file      PDMP Review     None          ED Provider  Electronically Signed by           Monica Antony PA-C  05/18/22 023

## 2022-05-18 NOTE — DISCHARGE INSTRUCTIONS
Continue all medications as directed  Return for new worsening complaints  Follow-up with primary care

## 2025-04-08 LAB
LEFT EYE DIABETIC RETINOPATHY: POSITIVE
RIGHT EYE DIABETIC RETINOPATHY: POSITIVE